# Patient Record
Sex: FEMALE | Race: WHITE | Employment: FULL TIME | URBAN - METROPOLITAN AREA
[De-identification: names, ages, dates, MRNs, and addresses within clinical notes are randomized per-mention and may not be internally consistent; named-entity substitution may affect disease eponyms.]

---

## 2018-03-04 ENCOUNTER — HOSPITAL ENCOUNTER (EMERGENCY)
Age: 33
Discharge: HOME | End: 2018-03-04
Attending: FAMILY MEDICINE

## 2018-03-04 VITALS
OXYGEN SATURATION: 98 % | TEMPERATURE: 97.2 F | SYSTOLIC BLOOD PRESSURE: 140 MMHG | HEART RATE: 86 BPM | HEIGHT: 67 IN | DIASTOLIC BLOOD PRESSURE: 81 MMHG | BODY MASS INDEX: 21.74 KG/M2 | RESPIRATION RATE: 18 BRPM | WEIGHT: 138.5 LBS

## 2018-03-04 DIAGNOSIS — J06.9 VIRAL URI WITH COUGH: Primary | ICD-10-CM

## 2018-03-04 LAB
INFLUENZA A AG (POC): NORMAL
INFLUENZA AG (POC) NEGATIVE CTRL.: NORMAL
INFLUENZA AG (POC) POSITIVE CTRL.: NORMAL
INFLUENZA B AG (POC): NORMAL
LOT NUMBER POC: NORMAL

## 2018-03-04 RX ORDER — CALCIUM CARBONATE 260MG(650)
TABLET,CHEWABLE ORAL
Qty: 1 PACKAGE | Refills: 0 | Status: SHIPPED | OUTPATIENT
Start: 2018-03-04 | End: 2019-01-05

## 2018-03-04 RX ORDER — BENZONATATE 100 MG/1
100 CAPSULE ORAL
Qty: 15 CAP | Refills: 0 | Status: SHIPPED | OUTPATIENT
Start: 2018-03-04 | End: 2018-03-09

## 2018-03-04 NOTE — DISCHARGE INSTRUCTIONS
Viral Respiratory Infection: Care Instructions  Your Care Instructions    Viruses are very small organisms. They grow in number after they enter your body. There are many types that cause different illnesses, such as colds and the mumps. The symptoms of a viral respiratory infection often start quickly. They include a fever, sore throat, and runny nose. You may also just not feel well. Or you may not want to eat much. Most viral respiratory infections are not serious. They usually get better with time and self-care. Antibiotics are not used to treat a viral infection. That's because antibiotics will not help cure a viral illness. In some cases, antiviral medicine can help your body fight a serious viral infection. Follow-up care is a key part of your treatment and safety. Be sure to make and go to all appointments, and call your doctor if you are having problems. It's also a good idea to know your test results and keep a list of the medicines you take. How can you care for yourself at home? · Rest as much as possible until you feel better. · Be safe with medicines. Take your medicine exactly as prescribed. Call your doctor if you think you are having a problem with your medicine. You will get more details on the specific medicine your doctor prescribes. · Take an over-the-counter pain medicine, such as acetaminophen (Tylenol), ibuprofen (Advil, Motrin), or naproxen (Aleve), as needed for pain and fever. Read and follow all instructions on the label. Do not give aspirin to anyone younger than 20. It has been linked to Reye syndrome, a serious illness. · Drink plenty of fluids, enough so that your urine is light yellow or clear like water. Hot fluids, such as tea or soup, may help relieve congestion in your nose and throat. If you have kidney, heart, or liver disease and have to limit fluids, talk with your doctor before you increase the amount of fluids you drink.   · Try to clear mucus from your lungs by breathing deeply and coughing. · Gargle with warm salt water once an hour. This can help reduce swelling and throat pain. Use 1 teaspoon of salt mixed in 1 cup of warm water. · Do not smoke or allow others to smoke around you. If you need help quitting, talk to your doctor about stop-smoking programs and medicines. These can increase your chances of quitting for good. To avoid spreading the virus  · Cough or sneeze into a tissue. Then throw the tissue away. · If you don't have a tissue, use your hand to cover your cough or sneeze. Then clean your hand. You can also cough into your sleeve. · Wash your hands often. Use soap and warm water. Wash for 15 to 20 seconds each time. · If you don't have soap and water near you, you can clean your hands with alcohol wipes or gel. When should you call for help? Call your doctor now or seek immediate medical care if:  ? · You have a new or higher fever. ? · Your fever lasts more than 48 hours. ? · You have trouble breathing. ? · You have a fever with a stiff neck or a severe headache. ? · You are sensitive to light. ? · You feel very sleepy or confused. ? Watch closely for changes in your health, and be sure to contact your doctor if:  ? · You do not get better as expected. Where can you learn more? Go to http://ally-dalton.info/. Enter P446 in the search box to learn more about \"Viral Respiratory Infection: Care Instructions. \"  Current as of: May 12, 2017  Content Version: 11.4  © 2428-6360 [employer]. Care instructions adapted under license by [employer] (which disclaims liability or warranty for this information). If you have questions about a medical condition or this instruction, always ask your healthcare professional. [address] any warranty or liability for your use of this information.

## 2018-03-04 NOTE — UC PROVIDER NOTE
HPI Comments:   2nd day of runny nose, nasal congestion, sore throat, mild dry cough. Subjective fever. Onset gradual and has gotten worse. \"whole house is sick\" her kids tested negative for flu and strep. She tried dayquil without any relief. Drinking plenty of fluids. No aggravating or alleviating factors. Denies underlying medical conditions/significant PMH. Is not a smoker. Patient is a 29 y.o. female presenting with cold symptoms. Cold Symptoms    Associated symptoms include congestion, rhinorrhea and sore throat. Pertinent negatives include no chest pain, no diarrhea, no rash and no wheezing. History reviewed. No pertinent past medical history. History reviewed. No pertinent surgical history. History reviewed. No pertinent family history. Social History     Social History    Marital status:      Spouse name: N/A    Number of children: N/A    Years of education: N/A     Occupational History    Not on file. Social History Main Topics    Smoking status: Never Smoker    Smokeless tobacco: Never Used    Alcohol use Not on file    Drug use: Not on file    Sexual activity: Not on file     Other Topics Concern    Not on file     Social History Narrative                ALLERGIES: Pcn [penicillins]    Review of Systems   Constitutional: Negative for chills, diaphoresis and fatigue. HENT: Positive for congestion, postnasal drip, rhinorrhea and sore throat. Negative for voice change. Eyes: Negative for visual disturbance. Respiratory: Negative for shortness of breath and wheezing. Cardiovascular: Negative for chest pain and palpitations. Gastrointestinal: Negative for diarrhea and rectal pain. Genitourinary: Negative for vaginal pain. Musculoskeletal: Negative for neck stiffness. Skin: Negative for rash. Neurological: Negative for weakness. All other systems reviewed and are negative.       Vitals:    03/04/18 1526   BP: 140/81   Pulse: 86   Resp: 18 Temp: 97.2 °F (36.2 °C)   SpO2: 98%   Weight: 62.8 kg (138 lb 8 oz)   Height: 5' 7\" (1.702 m)       Physical Exam   Constitutional: She is oriented to person, place, and time. No distress. Non-toxic appearing, well hydrated   HENT:   Mouth/Throat: No oropharyngeal exudate. Active nasal sniffling. TMs normal appearing bilat  Erythematous nasal turbinates with clear rhinorrhea  OP mild erythema without swelling or exudate. Uvula midline. No oral lesions. Eyes: EOM are normal. Pupils are equal, round, and reactive to light. No scleral icterus. Neck: Normal range of motion. Neck supple. Cardiovascular: Normal rate, regular rhythm and normal heart sounds. Exam reveals no gallop and no friction rub. No murmur heard. Pulmonary/Chest: Effort normal and breath sounds normal. No respiratory distress. She has no wheezes. She has no rales. Good air movement throughout   Abdominal: She exhibits no distension. Lymphadenopathy:     She has no cervical adenopathy. Neurological: She is alert and oriented to person, place, and time. No cranial nerve deficit. Skin: Skin is warm and dry. No rash noted. She is not diaphoretic. No erythema. No pallor. Psychiatric: She has a normal mood and affect. Her behavior is normal. Thought content normal.   Nursing note and vitals reviewed. MDM     Differential Diagnosis; Clinical Impression; Plan:       CLINICAL IMPRESSION:  (J06.9,  B97.89) Viral URI with cough  (primary encounter diagnosis)    Orders Placed This Encounter      POC INFLUENZA A & B SCREEN  RX      benzonatate (TESSALON PERLES) 100 mg capsule      oxymetazoline (AFRIN NO DRIP,OXYMETAZOLIN,) 0.05 % mist      zinc gluconate (ZICAM) 10 mg lozenges    Rapid flu negative. Classic viral URI presentation. Plan:  1. See above orders  2. Maintain adequate fluid inatke  3.  Review handtouts      We have reviewed concerning signs/symptoms, normal vs abnormal progression of medical condition and when to seek immediate medical attention. Schedule with PCP or Urgent Care immediately for worsening or new symptoms. See your PCP if there is not at least some improvement in symptoms within the next 7 days. You should see your PCP for updates on your routine health maintenance.    Risk of Significant Complications, Morbidity, and/or Mortality:   Presenting problems:  Low  Diagnostic procedures:  Low  Management options:  Low  Progress:   Patient progress:  Stable      Procedures

## 2019-01-05 ENCOUNTER — OFFICE VISIT (OUTPATIENT)
Dept: URGENT CARE | Age: 34
End: 2019-01-05

## 2019-01-05 VITALS
TEMPERATURE: 98 F | SYSTOLIC BLOOD PRESSURE: 119 MMHG | HEIGHT: 67 IN | DIASTOLIC BLOOD PRESSURE: 79 MMHG | RESPIRATION RATE: 18 BRPM | BODY MASS INDEX: 21.82 KG/M2 | WEIGHT: 139 LBS | HEART RATE: 100 BPM | OXYGEN SATURATION: 98 %

## 2019-01-05 DIAGNOSIS — J30.9 ALLERGIC RHINITIS, UNSPECIFIED SEASONALITY, UNSPECIFIED TRIGGER: ICD-10-CM

## 2019-01-05 DIAGNOSIS — J02.9 SORE THROAT: Primary | ICD-10-CM

## 2019-01-05 LAB
S PYO AG THROAT QL: NEGATIVE
VALID INTERNAL CONTROL?: YES

## 2019-01-05 RX ORDER — IBUPROFEN 600 MG/1
600 TABLET ORAL
Qty: 20 TAB | Refills: 0
Start: 2019-01-05

## 2019-01-05 RX ORDER — NORETHINDRONE ACETATE AND ETHINYL ESTRADIOL 5-7-9-7
KIT ORAL
Refills: 3 | COMMUNITY
Start: 2018-11-20

## 2019-01-05 RX ORDER — FLUTICASONE PROPIONATE 50 MCG
2 SPRAY, SUSPENSION (ML) NASAL DAILY
Qty: 1 BOTTLE | Refills: 0 | Status: SHIPPED | OUTPATIENT
Start: 2019-01-05

## 2019-01-05 NOTE — PROGRESS NOTES
Cold Symptoms   The history is provided by the patient. This is a recurrent (had similar symptoms starting on Delon day that resolved on their own after 4 days. ) problem. The current episode started yesterday. The problem has not changed since onset. There has been no fever. Associated symptoms include chills, headaches, rhinorrhea and sore throat. Pertinent negatives include no sweats, no myalgias, no shortness of breath, no nausea and no vomiting. Treatments tried: ibuprofen. The treatment provided mild relief. She is not a smoker. Her past medical history does not include bronchitis, pneumonia or asthma. History reviewed. No pertinent past medical history. History reviewed. No pertinent surgical history. History reviewed. No pertinent family history. Social History     Socioeconomic History    Marital status:      Spouse name: Not on file    Number of children: Not on file    Years of education: Not on file    Highest education level: Not on file   Social Needs    Financial resource strain: Not on file    Food insecurity - worry: Not on file    Food insecurity - inability: Not on file    Transportation needs - medical: Not on file   [employer] needs - non-medical: Not on file   Occupational History    Not on file   Tobacco Use    Smoking status: Never Smoker    Smokeless tobacco: Never Used   Substance and Sexual Activity    Alcohol use: Not on file    Drug use: Not on file    Sexual activity: Not on file   Other Topics Concern    Not on file   Social History Narrative    Not on file                ALLERGIES: Pcn [penicillins]    Review of Systems   Constitutional: Positive for chills. HENT: Positive for rhinorrhea, sneezing, sore throat and trouble swallowing. Negative for congestion, sinus pressure and voice change. Respiratory: Negative for cough and shortness of breath. Gastrointestinal: Negative for nausea and vomiting.    Musculoskeletal: Negative for myalgias. Neurological: Positive for headaches. Vitals:    01/05/19 0836   BP: 119/79   Pulse: 100   Resp: 18   Temp: 98 °F (36.7 °C)   SpO2: 98%   Weight: 139 lb (63 kg)   Height: 5' 7\" (1.702 m)       Physical Exam   Constitutional: She is oriented to person, place, and time. She appears well-developed and well-nourished. No distress. HENT:   Head: Normocephalic and atraumatic. Right Ear: External ear normal. Right ear drainage: occluded by cerumen. Left Ear: External ear normal. Left ear drainage: occluded by cerumen. Nose: Rhinorrhea present. No mucosal edema. Right sinus exhibits no maxillary sinus tenderness and no frontal sinus tenderness. Left sinus exhibits no maxillary sinus tenderness and no frontal sinus tenderness. Mouth/Throat: Uvula is midline and mucous membranes are normal. Posterior oropharyngeal erythema present. No oropharyngeal exudate or posterior oropharyngeal edema.   + facial flushing in T zone   Eyes: Pupils are equal, round, and reactive to light. Cardiovascular: Normal rate, normal heart sounds and intact distal pulses. No murmur heard. Pulmonary/Chest: Effort normal and breath sounds normal. No respiratory distress. She has no decreased breath sounds. She has no wheezes. She has no rhonchi. Abdominal: Soft. She exhibits no distension. Musculoskeletal: She exhibits no edema. Neurological: She is alert and oriented to person, place, and time. Skin: Skin is warm and dry. She is not diaphoretic. Psychiatric: She has a normal mood and affect. Her behavior is normal. Judgment normal.   Nursing note and vitals reviewed. MDM    Procedures      CLINICAL IMPRESSION:     ICD-10-CM ICD-9-CM    1. Sore throat J02.9 462 AMB POC RAPID STREP A      ibuprofen (MOTRIN) 600 mg tablet      CULTURE, STREP THROAT   2.  Allergic rhinitis, unspecified seasonality, unspecified trigger J30.9 477.9 fluticasone (FLONASE) 50 mcg/actuation nasal spray Plan:  F/U with PCP in 3-5 days INI  Orders Placed This Encounter    CULTURE, STREP THROAT    AMB POC RAPID STREP A    TRI-LEGEST FE 1-20(5)/1-30(7) /1mg-35mcg (9) tab     Sig: TAKE 1 TABLET BY MOUTH EVERY DAY     Refill:  3    fluticasone (FLONASE) 50 mcg/actuation nasal spray     Si Sprays by Both Nostrils route daily. Dispense:  1 Bottle     Refill:  0    ibuprofen (MOTRIN) 600 mg tablet     Sig: Take 1 Tab by mouth every six (6) hours as needed for Pain. Dispense:  20 Tab     Refill:  0           The patients condition was discussed with the patient and they understand. The patient is to follow up with PCP. If signs and symptoms become worse the pt is to go to the ER. The patient is to take medications as prescribed.

## 2019-01-05 NOTE — PATIENT INSTRUCTIONS
Follow Up with PCP in 3-5 days for routine care. Call to be seen sooner or return HERE, if no improvement with treatments advised/prescribed or symptoms/pain worsen (develop fever, pain worsens significantly, or develop NEW symptoms). Try using the nasal spray holding it with your opposing hand  for the nostril you are spraying (left hand, right nostril) and aim it at the ear on that side. Hold the bottle parallel to the floor then spray to see if this helps. May use  Benadryl, Phenylephrine, or Chlor-Trimetron for continued cough and sore throat. Claritin, allegra, Xyzal, or Zyrtec may also help. Also try Breathe-Rite (or similar) nose strips for nasal congestion and difficulty sleeping. Try 2 teaspoons of 100% pure honey at bedtime to help with nighttime coughing/sore throat. Vitamin C (0882-3675 mg) may also be helpful for your symptoms. *Frequent handwashing*, rest, and plenty of fluids are most important. Allergies: Care Instructions  Your Care Instructions    Allergies occur when your body's defense system (immune system) overreacts to certain substances. The immune system treats a harmless substance as if it were a harmful germ or virus. Many things can cause this overreaction, including pollens, medicine, food, dust, animal dander, and mold. Allergies can be mild or severe. Mild allergies can be managed with home treatment. But medicine may be needed to prevent problems. Managing your allergies is an important part of staying healthy. Your doctor may suggest that you have allergy testing to help find out what is causing your allergies. When you know what things trigger your symptoms, you can avoid them. This can prevent allergy symptoms and other health problems. For severe allergies that cause reactions that affect your whole body (anaphylactic reactions), your doctor may prescribe a shot of epinephrine to carry with you in case you have a severe reaction.  Learn how to give yourself the shot and keep it with you at all times. Make sure it is not . Follow-up care is a key part of your treatment and safety. Be sure to make and go to all appointments, and call your doctor if you are having problems. It's also a good idea to know your test results and keep a list of the medicines you take. How can you care for yourself at home? · If you have been told by your doctor that dust or dust mites are causing your allergy, decrease the dust around your bed:  ? Wash sheets, pillowcases, and other bedding in hot water every week. ? Use dust-proof covers for pillows, duvets, and mattresses. Avoid plastic covers because they tear easily and do not \"breathe. \" Wash as instructed on the label. ? Do not use any blankets and pillows that you do not need. ? Use blankets that you can wash in your washing machine. ? Consider removing drapes and carpets, which attract and hold dust, from your bedroom. · If you are allergic to house dust and mites, do not use home humidifiers. Your doctor can suggest ways you can control dust and mites. · Look for signs of cockroaches. Cockroaches cause allergic reactions. Use cockroach baits to get rid of them. Then, clean your home well. Cockroaches like areas where grocery bags, newspapers, empty bottles, or cardboard boxes are stored. Do not keep these inside your home, and keep trash and food containers sealed. Seal off any spots where cockroaches might enter your home. · If you are allergic to mold, get rid of furniture, rugs, and drapes that smell musty. Check for mold in the bathroom. · If you are allergic to outdoor pollen or mold spores, use air-conditioning. Change or clean all filters every month. Keep windows closed. · If you are allergic to pollen, stay inside when pollen counts are high. Use a vacuum  with a HEPA filter or a double-thickness filter at least two times each week. · Stay inside when air pollution is bad.  Avoid paint fumes, perfumes, and other strong odors. · Avoid conditions that make your allergies worse. Stay away from smoke. Do not smoke or let anyone else smoke in your house. Do not use fireplaces or wood-burning stoves. · If you are allergic to your pets, change the air filter in your furnace every month. Use high-efficiency filters. · If you are allergic to pet dander, keep pets outside or out of your bedroom. Old carpet and cloth furniture can hold a lot of animal dander. You may need to replace them. When should you call for help? Give an epinephrine shot if:    · You think you are having a severe allergic reaction.     · You have symptoms in more than one body area, such as mild nausea and an itchy mouth.    After giving an epinephrine shot call 911, even if you feel better.   Call 911 if:    · You have symptoms of a severe allergic reaction. These may include:  ? Sudden raised, red areas (hives) all over your body. ? Swelling of the throat, mouth, lips, or tongue. ? Trouble breathing. ? Passing out (losing consciousness). Or you may feel very lightheaded or suddenly feel weak, confused, or restless.     · You have been given an epinephrine shot, even if you feel better.    Call your doctor now or seek immediate medical care if:    · You have symptoms of an allergic reaction, such as:  ? A rash or hives (raised, red areas on the skin). ? Itching. ? Swelling. ? Belly pain, nausea, or vomiting.    Watch closely for changes in your health, and be sure to contact your doctor if:    · You do not get better as expected. Where can you learn more? Go to http://ally-dalton.info/. Enter J312 in the search box to learn more about \"Allergies: Care Instructions. \"  Current as of: June 28, 2018  Content Version: 11.8  © 7234-6660 [employer]. Care instructions adapted under license by [email or web address] (which disclaims liability or warranty for this information).  If you have questions about a medical condition or this instruction, always ask your healthcare professional. [place] [address] any warranty or liability for your use of this information.

## 2019-01-08 LAB — S PYO THROAT QL CULT: NEGATIVE

## 2020-07-16 ENCOUNTER — OFFICE VISIT (OUTPATIENT)
Dept: NEUROLOGY | Age: 35
End: 2020-07-16

## 2020-07-16 VITALS
WEIGHT: 134 LBS | OXYGEN SATURATION: 98 % | DIASTOLIC BLOOD PRESSURE: 70 MMHG | RESPIRATION RATE: 18 BRPM | HEART RATE: 80 BPM | HEIGHT: 67 IN | TEMPERATURE: 98.2 F | SYSTOLIC BLOOD PRESSURE: 118 MMHG | BODY MASS INDEX: 21.03 KG/M2

## 2020-07-16 DIAGNOSIS — G44.219 EPISODIC TENSION-TYPE HEADACHE, NOT INTRACTABLE: Primary | ICD-10-CM

## 2020-07-16 RX ORDER — SUMATRIPTAN 100 MG/1
50 TABLET, FILM COATED ORAL
COMMUNITY
Start: 2020-06-21

## 2020-07-16 RX ORDER — METHOCARBAMOL 750 MG/1
TABLET, FILM COATED ORAL
COMMUNITY
Start: 2020-06-21

## 2020-07-16 RX ORDER — DIPHENHYDRAMINE HCL 25 MG
25 CAPSULE ORAL
COMMUNITY
Start: 2020-06-22

## 2020-07-16 RX ORDER — METOCLOPRAMIDE 10 MG/1
10 TABLET ORAL
COMMUNITY
Start: 2020-06-22

## 2020-07-16 RX ORDER — ONDANSETRON 4 MG/1
TABLET, ORALLY DISINTEGRATING ORAL
COMMUNITY
Start: 2020-06-21

## 2020-07-16 RX ORDER — KETOROLAC TROMETHAMINE 10 MG/1
TABLET, FILM COATED ORAL
COMMUNITY
Start: 2020-06-21

## 2020-07-16 NOTE — PROGRESS NOTES
Referring Physician: Soraya Rivera MD     Reason for Consultation:  Headache     Chief Complaint: Migraines     History of Present Illness:   Philip Ware is a 37 y.o. female with hx of general anxiety disorder who presents to neurology clinic for evaluation of headaches. She reports that her headache started approximately 1 year ago and she had been having headaches approximately every 2 months. She reports from the month of December to June she did not have any headaches. In June 2020 patient reports that she felt a popping sensation in her head in the occipital region and since that time developed a migraine. It seems that during the month of June she had several headaches almost every week. She also reports that her last headache lasted approximately 10 days. She required a visit to urgent care requiring a Toradol injection for some mild improvement. It seems that this headache continued to worsen and patient was taken to the emergency room for a headache cocktail. This resolved her pain. She reports her last headache was on June 28. Characteristics:  Location: bilateral temples   Character: pressure on both sides  Radiation: none   Intensity: On average 7/10   Frequency: one every two months, then headache free since dec to mick   Duration: 2 days  Triggers: none, possibly stress. No worsening reported with cough/sneeze, bending over  Alleviating factors: imitrex, lying the dark room   Aura: none   Associated Sx with HA: nausea/vomiting, phonophotophobia.   Denies unilateral ptosis, conjunctival injection, lacrimation, sweating  Prior treatments: imitrex, over the counter   Prior Imaging: HCT normal   Caffeine use: 1 cup per day  H/O Head trauma:none  Depressive or anxiety Sx: anxiety   Stop BANG score (CUONG):  1 (snores, no apneic events)      Medical Hx:   JUAN   Headaches     Surgical Hx   tonnsillectomy     Social History     Tobacco Use    Smoking status: Never Smoker    Smokeless tobacco: Never Used   Substance Use Topics    Alcohol use: Not on file        Allergies   Allergen Reactions    Pcn [Penicillins] Rash        Prior to Admission medications    Medication Sig Start Date End Date Taking? Authorizing Provider   ondansetron (ZOFRAN ODT) 4 mg disintegrating tablet DISSOLVE 1 TO 2 TABLETS IN MOUTH EVERY 4 TO 6 HOURS AS NEEDED FOR NAUSEA 6/21/20  Yes Provider, Historical   SUMAtriptan (IMITREX) 100 mg tablet TAKE 1 TABLET BY MOUTH ONCE DAILY IF NEEDED FOR MIGRAINE MAY REPEAT IN 2 HOURS IF MIGRAINE PERSISTS. MAX 2 PER 24 HOURS 6/21/20  Yes Provider, Historical   TRI-LEGEST FE 1-20(5)/1-30(7) /1mg-35mcg (9) tab TAKE 1 TABLET BY MOUTH EVERY DAY 11/20/18  Yes Provider, Historical   ibuprofen (MOTRIN) 600 mg tablet Take 1 Tab by mouth every six (6) hours as needed for Pain. 1/5/19  Yes Thi Irizarry NP   diphenhydrAMINE (BENADRYL) 25 mg capsule Take 25 mg by mouth every six (6) hours as needed. 6/22/20   Provider, Historical   ketorolac (TORADOL) 10 mg tablet TAKE 1 TABLET BY MOUTH EVERY 8 HOURS AS NEEDED 6/21/20   Provider, Historical   methocarbamoL (ROBAXIN) 750 mg tablet TAKE 1 TABLET BY MOUTH EVERY 8 HOURS AS NEEDED 6/21/20   Provider, Historical   metoclopramide HCl (REGLAN) 10 mg tablet Take 10 mg by mouth every six (6) hours as needed. 6/22/20   Provider, Historical   fluticasone (FLONASE) 50 mcg/actuation nasal spray 2 Sprays by Both Nostrils route daily.  1/5/19   Eduin Perry NP       Review of Systems:  General, constitutional: negative  Eyes, vision: negative  Ears, nose, throat: negative  Cardiovascular, heart: negative  Respiratory: negative  Gastrointestinal: negative  Genitourinary: negative  Musculoskeletal: negative  Skin and integumentary: negative  Psychiatric: negative  Endocrine: negative  Neurological: negative, except for HPI  Hematologic/lymphatic: negative  Allergy/immunology: negative    Visit Vitals  /70   Pulse 80   Temp 98.2 °F (36.8 °C) (Oral)   Resp 18   Ht 5' 7\" (1.702 m)   Wt 134 lb (60.8 kg)   SpO2 98%   BMI 20.99 kg/m²       Physical Exam:  General:  no acute distress  Neck: no carotid bruits  Lungs: clear to auscultation  Heart:  no murmurs, regular rate and rhythm   Lower extremity: no edema    Neurological exam:  Mental Status: Awake, alert, oriented to person, place and time  Attention and Concentration: able to state the days of the week backwards   Speech and Language: No dysarthria. Able to name, repeat and follow commands   Fund of knowledge was preserved    Cranial nerves: II-XII  Pupils equal and reactive, visual fields intact by confrontation   Fundus: venous pulsations and sharp disc margins noted bilaterally  Extraocular movements intact, no evidence of nystagmus or ptosis   Facial sensation intact   Facial movements symmetric   Hearing intact to soft rub bilaterally   Shoulder shrug symmetric and strong   Tongue protrusion full and midline without fasciculation or atrophy    Motor:   Normal tone and Bulk   Drift: No evidence of pronator drift     Strength testing:   deltoid triceps biceps Wrist ext. Wrist flex. intrinsics   Right 5 5 5 5 5 5   Left 5 5 5 5 5 5      Hip flex. Hip ext. Knee ext. Knee flex Dorsi flex Plantar flex   Right  5 5 5 5 5 5   Left  5 5 5 5 5 5       Sensory:  Intact to light touch and temp, no extinction     Reflexes:     Biceps Triceps  Brachiorad Patellar Achilles Plantar Hoffmans   Right  2 2 2 2 2 Down Neg   Left  2 2 2 2 2 Down Neg        Cerebellar testing:  No dysmetria. Normal rapid alternating movements; finger-to-nose and heel-to- shin testing are within normal limits. Romberg: absent    Gait: steady. Heel, toe, and tandem gait were normal    Data:   No data available for review. Assessment and Plan   Thomas Eden is a 37 y.o. female with hx of general anxiety disorder who presents to neurology clinic for evaluation of headaches which do appear to be tension type headaches with some migrainous features. Her neurological exam is unremarkable. Tension type headaches: With migrainous features headaches occur once every 2 months. For abortive therapy will continue with Imitrex however will decrease dose to 50 mg at the onset of the headache. I did discuss that patient can take a second dose at the time of the headache however would recommend her taking this with Aleve. Given headache frequency occurs once every 2 months, patient does not need preventative therapy at this time. We did discuss migraine relief as this would be a safe alternative for headache prevention. She was given information regarding this medication. Given that patient just started having headaches will obtain an MRI of the brain with and without contrast to ensure that there are no acute abnormalities resulting in her headaches. The likelihood of this is low given her normal neurological exam and no evidence of papilledema. We discussed the importance of staying hydrated and proper sleep hygiene to help prevent headaches. We also discussed the importance of diet and exercise as this can help reduce headaches. I did recommend patient keep a log of her headaches to determine a potential trigger. I have discussed the diagnosis with the patient and the intended plan as seen in the above orders. Patient is in agreement. The patient has received an after-visit summary and questions were answered concerning future plans. I have discussed medication side effects and warnings with the patient as well.         Signed By:  Cesar Melissa MD     July 16, 2020

## 2020-07-16 NOTE — PATIENT INSTRUCTIONS
Migraine Headache: Care Instructions  Your Care Instructions  Migraines are painful, throbbing headaches that often start on one side of the head. They may cause nausea and vomiting and make you sensitive to light, sound, or smell. Without treatment, migraines can last from 4 hours to a few days. Medicines can help prevent migraines or stop them after they have started. Your doctor can help you find which ones work best for you. Follow-up care is a key part of your treatment and safety. Be sure to make and go to all appointments, and call your doctor if you are having problems. It's also a good idea to know your test results and keep a list of the medicines you take. How can you care for yourself at home? · Do not drive if you have taken a prescription pain medicine. · Rest in a quiet, dark room until your headache is gone. Close your eyes, and try to relax or go to sleep. Don't watch TV or read. · Put a cold, moist cloth or cold pack on the painful area for 10 to 20 minutes at a time. Put a thin cloth between the cold pack and your skin. · Use a warm, moist towel or a heating pad set on low to relax tight shoulder and neck muscles. · Have someone gently massage your neck and shoulders. · Take your medicines exactly as prescribed. Call your doctor if you think you are having a problem with your medicine. You will get more details on the specific medicines your doctor prescribes. · Don't take medicine for headache pain too often. Talk to your doctor if you are taking medicine more than 2 days a week to stop a headache. Taking too much pain medicine can lead to more headaches. These are called medicine-overuse headaches. To prevent migraines  · Keep a headache diary so you can figure out what triggers your headaches. Avoiding triggers may help you prevent headaches. Record when each headache began, how long it lasted, and what the pain was like.  Write down any other symptoms you had with the headache, such as nausea, flashing lights or dark spots, or sensitivity to bright light or loud noise. Note if the headache occurred near your period. List anything that might have triggered the headache. Triggers may include certain foods (chocolate, cheese, wine) or odors, smoke, bright light, stress, or lack of sleep. · If your doctor has prescribed medicine for your migraines, take it as directed. You may have medicine that you take only when you get a migraine and medicine that you take all the time to help prevent migraines. ? If your doctor has prescribed medicine for when you get a headache, take it at the first sign of a migraine, unless your doctor has given you other instructions. ? If your doctor has prescribed medicine to prevent migraines, take it exactly as prescribed. Call your doctor if you think you are having a problem with your medicine. · Find healthy ways to deal with stress. Migraines are most common during or right after stressful times. Try finding ways to reduce stress like practicing mindfulness or deep breathing exercises. · Get plenty of sleep and exercise. But be careful to not push yourself too hard during exercise. It may trigger a headache. · Eat meals on a regular schedule. Avoid foods and drinks that often trigger migraines. These include chocolate, alcohol (especially red wine and port), aspartame, monosodium glutamate (MSG), and some additives found in foods (such as hot dogs, lin, cold cuts, aged cheeses, and pickled foods). · Limit caffeine. Don't drink too much coffee, tea, or soda. But don't quit caffeine suddenly. That can also give you migraines. · Do not smoke or allow others to smoke around you. If you need help quitting, talk to your doctor about stop-smoking programs and medicines. These can increase your chances of quitting for good. · If you are taking birth control pills or hormone therapy, talk to your doctor about whether they are triggering your migraines.   When should you call for help? [id number] anytime you think you may need emergency care. For example, call if:  · You have signs of a stroke. These may include:  ? Sudden numbness, paralysis, or weakness in your face, arm, or leg, especially on only one side of your body. ? Sudden vision changes. ? Sudden trouble speaking. ? Sudden confusion or trouble understanding simple statements. ? Sudden problems with walking or balance. ? A sudden, severe headache that is different from past headaches. Call your doctor now or seek immediate medical care if:  · You have new or worse nausea and vomiting. · You have a new or higher fever. · Your headache gets much worse. Watch closely for changes in your health, and be sure to contact your doctor if:  · You are not getting better after 2 days (48 hours). Where can you learn more? Go to http://ally-dalton.info/  Enter Q785 in the search box to learn more about \"Migraine Headache: Care Instructions. \"  Current as of: November 20, 2019               Content Version: 12.5  © 6034-6326 [employer]. Care instructions adapted under license by [employer] (which disclaims liability or warranty for this information). If you have questions about a medical condition or this instruction, always ask your healthcare professional. [address] any warranty or liability for your use of this information.

## 2020-08-14 ENCOUNTER — HOSPITAL ENCOUNTER (OUTPATIENT)
Dept: MRI IMAGING | Age: 35
Discharge: HOME | End: 2020-08-14
Attending: PSYCHIATRY & NEUROLOGY
Payer: COMMERCIAL

## 2020-08-14 DIAGNOSIS — G44.219 EPISODIC TENSION-TYPE HEADACHE, NOT INTRACTABLE: ICD-10-CM

## 2020-08-14 PROCEDURE — A9575 INJ GADOTERATE MEGLUMI 0.1ML: HCPCS | Performed by: PSYCHIATRY & NEUROLOGY

## 2020-08-14 PROCEDURE — 70553 MRI BRAIN STEM W/O & W/DYE: CPT

## 2020-08-14 PROCEDURE — 74011250636 HC RX REV CODE- 250/636: Performed by: PSYCHIATRY & NEUROLOGY

## 2020-08-14 RX ORDER — GADOTERATE MEGLUMINE 376.9 MG/ML
15 INJECTION INTRAVENOUS
Status: COMPLETED | OUTPATIENT
Start: 2020-08-14 | End: 2020-08-14

## 2020-08-14 RX ADMIN — GADOTERATE MEGLUMINE 12 ML: 376.9 INJECTION INTRAVENOUS at 11:11

## 2020-11-25 ENCOUNTER — TRANSCRIBE ORDER (OUTPATIENT)
Dept: SCHEDULING | Age: 35
End: 2020-11-25

## 2020-11-25 DIAGNOSIS — R10.33 PERIUMBILICAL ABDOMINAL PAIN: Primary | ICD-10-CM

## 2020-12-14 ENCOUNTER — HOSPITAL ENCOUNTER (OUTPATIENT)
Dept: CT IMAGING | Age: 35
Discharge: HOME | End: 2020-12-14
Attending: INTERNAL MEDICINE
Payer: COMMERCIAL

## 2020-12-14 DIAGNOSIS — R10.33 PERIUMBILICAL ABDOMINAL PAIN: ICD-10-CM

## 2020-12-14 PROCEDURE — 74177 CT ABD & PELVIS W/CONTRAST: CPT

## 2020-12-14 PROCEDURE — 74011000636 HC RX REV CODE- 636: Performed by: INTERNAL MEDICINE

## 2020-12-14 RX ADMIN — IOPAMIDOL 100 ML: 755 INJECTION, SOLUTION INTRAVENOUS at 13:47

## 2021-09-29 ENCOUNTER — OFFICE VISIT (OUTPATIENT)
Dept: NEUROLOGY | Age: 36
End: 2021-09-29
Payer: COMMERCIAL

## 2021-09-29 VITALS
HEART RATE: 87 BPM | HEIGHT: 67 IN | DIASTOLIC BLOOD PRESSURE: 74 MMHG | SYSTOLIC BLOOD PRESSURE: 136 MMHG | OXYGEN SATURATION: 98 % | BODY MASS INDEX: 21.03 KG/M2 | RESPIRATION RATE: 16 BRPM | WEIGHT: 134 LBS

## 2021-09-29 DIAGNOSIS — G44.219 EPISODIC TENSION-TYPE HEADACHE, NOT INTRACTABLE: ICD-10-CM

## 2021-09-29 DIAGNOSIS — M54.12 CERVICAL RADICULOPATHY: Primary | ICD-10-CM

## 2021-09-29 PROCEDURE — 99214 OFFICE O/P EST MOD 30 MIN: CPT | Performed by: PSYCHIATRY & NEUROLOGY

## 2021-09-29 NOTE — PATIENT INSTRUCTIONS

## 2021-09-29 NOTE — PROGRESS NOTES
Chief Complaint   Patient presents with    Follow-up     nerve pain in neck/back of the head, hasn't changed        PCP gave her a course of prednisone that helped     Has not had any injures in the past

## 2021-09-29 NOTE — PROGRESS NOTES
Chief Complaint: Migraines     History of Present Illness:   Mell Madrid is a 31 y.o. female with hx of general anxiety disorder who presents to neurology clinic for evaluation of headaches. She reports that her headache started approximately 1 year ago and she had been having headaches approximately every 2 months. She reports from the month of December to June she did not have any headaches. In June 2020 patient reports that she felt a popping sensation in her head in the occipital region and since that time developed a migraine. It seems that during the month of June she had several headaches almost every week. She also reports that her last headache lasted approximately 10 days. She required a visit to urgent care requiring a Toradol injection for some mild improvement. It seems that this headache continued to worsen and patient was taken to the emergency room for a headache cocktail. This resolved her pain. She reports her last headache was on June 28. Characteristics:  Location: bilateral temples   Character: pressure on both sides  Radiation: none   Intensity: On average 7/10   Frequency: one every two months, then headache free since dec to mick   Duration: 2 days  Triggers: none, possibly stress. No worsening reported with cough/sneeze, bending over  Alleviating factors: imitrex, lying the dark room   Aura: none   Associated Sx with HA: nausea/vomiting, phonophotophobia. Denies unilateral ptosis, conjunctival injection, lacrimation, sweating  Prior treatments: imitrex, over the counter   Prior Imaging: HCT normal   Caffeine use: 1 cup per day  H/O Head trauma:none  Depressive or anxiety Sx: anxiety   Stop BANG score (CUONG):  1 (snores, no apneic events)    Interval hx:   Patient returns to clinic today reports that she has been having more pain in the back of the head and the neck.   She reports that she continues to have this popping sensation at the back of her head followed by numbness and tingling in that area. She reports that her headaches have improved with the use of migrelief MAT bridge. Medical Hx:   JUAN   Headaches     Surgical Hx   tonnsillectomy     Social History     Tobacco Use    Smoking status: Never Smoker    Smokeless tobacco: Never Used   Substance Use Topics    Alcohol use: Yes     Alcohol/week: 1.0 - 2.0 standard drinks     Types: 1 - 2 Glasses of wine per week        Allergies   Allergen Reactions    Pcn [Penicillins] Rash        Prior to Admission medications    Medication Sig Start Date End Date Taking? Authorizing Provider   ibuprofen (MOTRIN) 600 mg tablet Take 1 Tab by mouth every six (6) hours as needed for Pain. 1/5/19  Yes Annette Irizarry, NP   diphenhydrAMINE (BENADRYL) 25 mg capsule Take 25 mg by mouth every six (6) hours as needed. Patient not taking: Reported on 9/29/2021 6/22/20   Provider, Historical   ketorolac (TORADOL) 10 mg tablet TAKE 1 TABLET BY MOUTH EVERY 8 HOURS AS NEEDED  Patient not taking: Reported on 9/29/2021 6/21/20   Provider, Historical   methocarbamoL (ROBAXIN) 750 mg tablet TAKE 1 TABLET BY MOUTH EVERY 8 HOURS AS NEEDED  Patient not taking: Reported on 9/29/2021 6/21/20   Provider, Historical   metoclopramide HCl (REGLAN) 10 mg tablet Take 10 mg by mouth every six (6) hours as needed. Patient not taking: Reported on 9/29/2021 6/22/20   Provider, Historical   ondansetron (ZOFRAN ODT) 4 mg disintegrating tablet DISSOLVE 1 TO 2 TABLETS IN MOUTH EVERY 4 TO 6 HOURS AS NEEDED FOR NAUSEA  Patient not taking: Reported on 9/29/2021 6/21/20   Provider, Historical   SUMAtriptan (IMITREX) 100 mg tablet 50 mg.  Taking 50 mg  Patient not taking: Reported on 9/29/2021 6/21/20   Provider, Historical   TRI-LEGEST FE 1-20(5)/1-30(7) /1mg-35mcg (9) tab TAKE 1 TABLET BY MOUTH EVERY DAY  Patient not taking: Reported on 9/29/2021 11/20/18   Provider, Historical   fluticasone (FLONASE) 50 mcg/actuation nasal spray 2 Sprays by Both Nostrils route daily. Patient not taking: Reported on 9/29/2021 1/5/19   Salvador Larson NP       Review of Systems:  General, constitutional: negative  Eyes, vision: negative  Ears, nose, throat: negative  Cardiovascular, heart: negative  Respiratory: negative  Gastrointestinal: negative  Genitourinary: negative  Musculoskeletal: negative  Skin and integumentary: negative  Psychiatric: negative  Endocrine: negative  Neurological: negative, except for HPI  Hematologic/lymphatic: negative  Allergy/immunology: negative    Visit Vitals  /74 (BP 1 Location: Left arm, BP Patient Position: Sitting, BP Cuff Size: Adult)   Pulse 87   Resp 16   Ht 5' 7\" (1.702 m)   Wt 134 lb (60.8 kg)   SpO2 98%   BMI 20.99 kg/m²       Physical Exam:  General:  no acute distress  Neck: no carotid bruits  Lungs: clear to auscultation  Heart:  no murmurs, regular rate and rhythm   Lower extremity: no edema    Neurological exam:  Mental Status: Awake, alert, oriented to person, place and time  Attention and Concentration: able to state the days of the week backwards   Speech and Language: No dysarthria. Able to name, repeat and follow commands   Fund of knowledge was preserved    Cranial nerves: II-XII  Pupils equal and reactive, visual fields intact by confrontation   Extraocular movements intact, no evidence of nystagmus or ptosis   Facial sensation intact   Facial movements symmetric   Hearing intact to soft rub bilaterally   Shoulder shrug symmetric and strong   Tongue protrusion full and midline without fasciculation or atrophy    Motor:   Normal tone and Bulk   Drift: No evidence of pronator drift     Strength testing:   deltoid triceps biceps Wrist ext. Wrist flex. intrinsics   Right 5 5 5 5 5 5   Left 5 5 5 5 5 5      Hip flex. Hip ext. Knee ext.   Knee flex Dorsi flex Plantar flex   Right  5 5 5 5 5 5   Left  5 5 5 5 5 5       Sensory:  Intact to light touch and temp, no extinction     Reflexes:     Biceps Triceps  Brachiorad Patellar Achilles Plantar Hoffmans   Right  2 2 2 2 2 Down Neg   Left  2 2 2 2 2 Down Neg        Cerebellar testing:  No dysmetria. finger-to-nose and heel-to- shin testing are within normal limits. Romberg: absent    Gait: steady. Heel, toe, and tandem gait were normal    Data:   No data available for review. Assessment and Plan   Astrid Reynolds is a 31 y.o. female with hx of general anxiety disorder who presents to neurology clinic for evaluation of headaches which do appear to be tension type headaches with some migrainous features. Her neurological exam is unremarkable. Tension type headaches: With migrainous features headaches occur once every 2 months. Headaches have improved in frequency and severity  For abortive therapy will continue with Imitrex however will decrease dose to 50 mg at the onset of the headache. I did discuss that patient can take a second dose at the time of the headache however would recommend her taking this with Aleve. Given headache frequency occurs once every 2 months, patient does not need preventative therapy at this time. We did discuss migraine relief as this would be a safe alternative for headache prevention. She was given information regarding this medication. Given that patient just started having headaches will obtain an MRI of the brain with and without contrast to ensure that there are no acute abnormalities resulting in her headaches. The likelihood of this is low given her normal neurological exam and no evidence of papilledema. We discussed the importance of staying hydrated and proper sleep hygiene to help prevent headaches. We also discussed the importance of diet and exercise as this can help reduce headaches. I did recommend patient keep a log of her headaches to determine a potential trigger. Cervical radiculopathy: Based on symptoms it is likely that patient does have a component of cervical radiculopathy.   This may have also been contributing to her headaches previously. She has had an x-ray with no abnormality noted. -We will obtain an MRI of the cervical spine to ensure that there is no evidence of stenosis and determine if there is evidence of radiculopathy.  -Her neurological exam is unremarkable and therefore I am not concerned for stenosis. -I did recommend that she trial physical therapy as well as dry needling as this will most likely be beneficial in her pain and symptoms. We did decide to hold off on this until her MRI is complete.  -If insurance does not allow for an MRI to be done, I would recommend an EMG with nerve conduction studies of the upper extremities. I have discussed the diagnosis with the patient and the intended plan as seen in the above orders. Patient is in agreement. The patient has received an after-visit summary and questions were answered concerning future plans. I have discussed medication side effects and warnings with the patient as well.         Signed By:  Antwon Waterman MD     September 29, 2021

## 2021-10-14 ENCOUNTER — HOSPITAL ENCOUNTER (OUTPATIENT)
Dept: MRI IMAGING | Age: 36
Discharge: HOME | End: 2021-10-14
Attending: PSYCHIATRY & NEUROLOGY
Payer: COMMERCIAL

## 2021-10-14 DIAGNOSIS — M54.12 CERVICAL RADICULOPATHY: ICD-10-CM

## 2021-10-14 PROCEDURE — 72141 MRI NECK SPINE W/O DYE: CPT

## 2021-11-30 ENCOUNTER — OFFICE VISIT (OUTPATIENT)
Dept: PRIMARY CARE CLINIC | Age: 36
End: 2021-11-30

## 2021-11-30 ENCOUNTER — TELEPHONE (OUTPATIENT)
Dept: PRIMARY CARE CLINIC | Age: 36
End: 2021-11-30

## 2021-11-30 DIAGNOSIS — B34.9 VIRAL ILLNESS: Primary | ICD-10-CM

## 2021-11-30 LAB — SARS-COV-2 POC: NEGATIVE

## 2021-11-30 PROCEDURE — 87426 SARSCOV CORONAVIRUS AG IA: CPT | Performed by: EMERGENCY MEDICINE

## 2022-01-02 ENCOUNTER — OFFICE VISIT (OUTPATIENT)
Dept: URGENT CARE | Age: 37
End: 2022-01-02

## 2022-01-02 VITALS — TEMPERATURE: 97.9 F | OXYGEN SATURATION: 98 % | HEART RATE: 82 BPM | RESPIRATION RATE: 17 BRPM

## 2022-01-02 DIAGNOSIS — Z20.822 SUSPECTED COVID-19 VIRUS INFECTION: ICD-10-CM

## 2022-01-02 DIAGNOSIS — Z20.822 EXPOSURE TO COVID-19 VIRUS: Primary | ICD-10-CM

## 2022-01-02 LAB — SARS-COV-2 POC: POSITIVE

## 2022-01-02 PROCEDURE — 87426 SARSCOV CORONAVIRUS AG IA: CPT | Performed by: FAMILY MEDICINE

## 2022-01-02 PROCEDURE — 99212 OFFICE O/P EST SF 10 MIN: CPT | Performed by: FAMILY MEDICINE

## 2022-01-02 NOTE — PROGRESS NOTES
This patient was seen at [address] [place] [address] Urgent Care while in their vehicle due to COVID-19 pandemic with PPE and focused examination in order to decrease community viral transmission. The patient/guardian gave verbal consent to treat. The history is provided by the patient. Nasal Congestion  This is a new problem. The current episode started more than 2 days ago. The problem occurs constantly. Associated symptoms include headaches. Associated symptoms comments: Runny and stuffy  With loss of smell and taste x 2 days  No fever- no bodyache. Nothing aggravates the symptoms. Nothing relieves the symptoms. She has tried nothing for the symptoms. History reviewed. No pertinent past medical history. History reviewed. No pertinent surgical history. History reviewed. No pertinent family history. Social History     Socioeconomic History    Marital status:      Spouse name: Not on file    Number of children: Not on file    Years of education: Not on file    Highest education level: Not on file   Occupational History    Not on file   Tobacco Use    Smoking status: Never Smoker    Smokeless tobacco: Never Used   Vaping Use    Vaping Use: Never used   Substance and Sexual Activity    Alcohol use: Yes     Alcohol/week: 1.0 - 2.0 standard drink     Types: 1 - 2 Glasses of wine per week    Drug use: Never    Sexual activity: Yes     Birth control/protection: None   Other Topics Concern    Not on file   Social History Narrative    Not on file     Social Determinants of Health     Financial Resource Strain:     Difficulty of Paying Living Expenses: Not on file   Food Insecurity:     Worried About Running Out of Food in the Last Year: Not on file    Adam of Food in the Last Year: Not on file   Transportation Needs:     Lack of Transportation (Medical): Not on file    Lack of Transportation (Non-Medical):  Not on file   Physical Activity:     Days of Exercise per Week: Not on file    Minutes of Exercise per Session: Not on file   Stress:     Feeling of Stress : Not on file   Social Connections:     Frequency of Communication with Friends and Family: Not on file    Frequency of Social Gatherings with Friends and Family: Not on file    Attends Denominational Services: Not on file    Active Member of Clubs or Organizations: Not on file    Attends Club or Organization Meetings: Not on file    Marital Status: Not on file   Intimate Partner Violence:     Fear of Current or Ex-Partner: Not on file    Emotionally Abused: Not on file    Physically Abused: Not on file    Sexually Abused: Not on file   Housing Stability:     Unable to Pay for Housing in the Last Year: Not on file    Number of [place] in the Last Year: Not on file    Unstable Housing in the Last Year: Not on file                ALLERGIES: Pcn [penicillins]    Review of Systems   Neurological: Positive for headaches. All other systems reviewed and are negative. Vitals:    01/02/22 1309   Pulse: 82   Resp: 17   Temp: 97.9 °F (36.6 °C)   SpO2: 98%       Physical Exam  Vitals and nursing note reviewed. Constitutional:       General: She is not in acute distress. Appearance: She is not ill-appearing. Pulmonary:      Effort: Pulmonary effort is normal. No respiratory distress. Breath sounds: No wheezing. MDM    Procedures      ICD-10-CM ICD-9-CM    1. Exposure to COVID-19 virus  Z20.822 V01.79 AMB POC SARS-COV-2   2. Suspected COVID-19 virus infection  Z20.822 V01.79 AMB POC SARS-COV-2     No orders of the defined types were placed in this encounter. Results for orders placed or performed in visit on 01/02/22   AMB POC SARS-COV-2   Result Value Ref Range    SARS-COV-2 POC Positive (A) Negative       Isolation x 1o days  Symptomatic rx     The patients condition was discussed with the patient and they understand. The patient is to follow up with primary care doctor.   If signs and symptoms become worse the pt is to go to the ER. The patient is to take medications as prescribed.

## 2022-01-19 ENCOUNTER — TELEPHONE (OUTPATIENT)
Dept: NEUROLOGY | Age: 37
End: 2022-01-19

## 2022-01-19 NOTE — TELEPHONE ENCOUNTER
MRI of cerv spine is pending denial    [phone] opt 4  Ref# [id number]    Info will be faxed to office as well.

## 2022-01-19 NOTE — PROGRESS NOTES
Please let patient know that the MRI of her neck did show some mild degenerative changes in the neck which could be resulting in her headaches. She would benefit from physical therapy if she is agreeable.   Please let patient know and I will place the order

## 2022-01-19 NOTE — TELEPHONE ENCOUNTER
----- Message from Amelia Rocha MD sent at 1/19/2022 12:10 PM EST -----  Please let patient know that the MRI of her neck did show some mild degenerative changes in the neck which could be resulting in her headaches. She would benefit from physical therapy if she is agreeable.   Please let patient know and I will place the order

## 2022-01-19 NOTE — TELEPHONE ENCOUNTER
Tried calling patient. Left patient voicemail to give the office a call back. Also stated I sent a [employer] message.

## 2022-05-19 LAB
CHOLEST SERPL-MCNC: 156 MG/DL
GLUCOSE SERPL-MCNC: 86 MG/DL (ref 65–100)
HDLC SERPL-MCNC: 104 MG/DL
LDLC SERPL CALC-MCNC: 39.4 MG/DL (ref 0–100)
TRIGL SERPL-MCNC: 63 MG/DL (ref ?–150)

## 2022-12-30 ENCOUNTER — OFFICE VISIT (OUTPATIENT)
Dept: PRIMARY CARE CLINIC | Age: 37
End: 2022-12-30
Payer: COMMERCIAL

## 2022-12-30 VITALS
SYSTOLIC BLOOD PRESSURE: 120 MMHG | RESPIRATION RATE: 18 BRPM | OXYGEN SATURATION: 97 % | HEART RATE: 80 BPM | WEIGHT: 142 LBS | HEIGHT: 67 IN | DIASTOLIC BLOOD PRESSURE: 81 MMHG | BODY MASS INDEX: 22.29 KG/M2 | TEMPERATURE: 97.8 F

## 2022-12-30 DIAGNOSIS — R11.0 NAUSEA: ICD-10-CM

## 2022-12-30 DIAGNOSIS — B34.9 VIRAL ILLNESS: Primary | ICD-10-CM

## 2022-12-30 LAB
FLUAV+FLUBV AG NOSE QL IA.RAPID: NEGATIVE
FLUAV+FLUBV AG NOSE QL IA.RAPID: NEGATIVE
VALID INTERNAL CONTROL?: YES

## 2022-12-30 RX ORDER — DEXTROMETHORPHAN HYDROBROMIDE AND GUAIFENESIN 20; 400 MG/20ML; MG/20ML
5 SOLUTION ORAL 4 TIMES DAILY
Qty: 120 ML | Refills: 1 | Status: SHIPPED | OUTPATIENT
Start: 2022-12-30 | End: 2023-01-09

## 2022-12-30 RX ORDER — FLUOXETINE 10 MG/1
10 CAPSULE ORAL DAILY
COMMUNITY
Start: 2022-12-15

## 2022-12-30 RX ORDER — ONDANSETRON 4 MG/1
4 TABLET, ORALLY DISINTEGRATING ORAL
Qty: 24 TABLET | Refills: 1 | Status: SHIPPED | OUTPATIENT
Start: 2022-12-30 | End: 2023-01-09

## 2022-12-30 NOTE — PROGRESS NOTES
Identified pt with two pt identifiers(name and ). Reviewed record in preparation for visit and have obtained necessary documentation. Chief Complaint   Patient presents with    Cold Symptoms     Would like flu test. Symptoms nausea past 48 hours. Headache. Vitals:    22 1253   BP: 120/81   Pulse: 80   Resp: 18   Temp: 97.8 °F (36.6 °C)   TempSrc: Temporal   SpO2: 97%   Weight: 142 lb (64.4 kg)   Height: 5' 7\" (1.702 m)   PainSc:   4       Health Maintenance Due   Topic    Hepatitis C Screening     Depression Screen     COVID-19 Vaccine (1)    Cervical cancer screen     Flu Vaccine (1)       Coordination of Care Questionnaire:  :   1) Have you been to an emergency room, urgent care, or hospitalized since your last visit? If yes, where when, and reason for visit? no       2. Have seen or consulted any other health care provider since your last visit? If yes, where when, and reason for visit? NO      Patient is accompanied by self I have received verbal consent from Jarett Shah to discuss any/all medical information while they are present in the room. An electronic signature was used to authenticate this note.   -- Attila Lucas LPN

## 2022-12-30 NOTE — PROGRESS NOTES
Sunil Bolanos ( 1985) is a 30 y.o. female, established patient, here for evaluation of the following chief complaint(s):  Cold Symptoms (Would like flu test. Symptoms nausea past 48 hours. Headache. )   Covid test this AM - negative. ASSESSMENT/PLAN:  Diagnoses and all orders for this visit:    1. Viral illness  -     AMB POC KENNEDY INFLUENZA A/B TEST    2. Nausea    Other orders  -     ondansetron (ZOFRAN ODT) 4 mg disintegrating tablet; Take 1 Tablet by mouth every eight (8) hours as needed for Nausea or Vomiting for up to 10 days. -     dextromethorphan-guaiFENesin (Delsym Cough-Chest Congest DM) 5-100 mg/5 mL liqd; Take 5 mL by mouth four (4) times daily for 10 days. Return in about 1 week (around 1/6/2023), or if symptoms worsen or fail to improve, for URI symptoms. Cold Symptoms  The history is provided by the Patient. This is a new problem. The current episode started 2 days ago. The problem occurs hourly. The cough is Non-productive. Associated symptoms include rhinorrhea and nausea. Pertinent negatives include no chest pain, no chills, no sweats, no weight loss and no ear pain. She has tried nothing for the symptoms. She is not a smoker. Her past medical history does not include COPD, heart failure or CHF. Subjective:   Pt is a 30 y.o. female (Would like flu test. Symptoms nausea past 48 hours. Headache. )  Nurse staff around her also have symptoms. Home COVID test this AM negative. Review of Systems   Constitutional:  Negative for chills, diaphoresis, fever and weight loss. HENT:  Positive for congestion and rhinorrhea. Negative for ear pain. Eyes: Negative. Respiratory:  Positive for cough. Cardiovascular: Negative. Negative for chest pain. Gastrointestinal:  Positive for nausea. Skin: Negative. No past medical history on file. No past surgical history on file.     Results for orders placed or performed in visit on 12/30/22   [facility] KENNEDY INFLUENZA A/B TEST   Result Value Ref Range    VALID INTERNAL CONTROL POC Yes     Influenza A Ag POC Negative Negative    Influenza B Ag POC Negative Negative             Objective:     Physical Exam  Vitals and nursing note reviewed. Constitutional:       Appearance: Normal appearance. She is normal weight. HENT:      Head: Normocephalic and atraumatic. Right Ear: Tympanic membrane and ear canal normal.      Left Ear: Tympanic membrane and ear canal normal.      Nose: Congestion and rhinorrhea present. Eyes:      Extraocular Movements: Extraocular movements intact. Conjunctiva/sclera: Conjunctivae normal.      Pupils: Pupils are equal, round, and reactive to light. Cardiovascular:      Rate and Rhythm: Normal rate. Pulmonary:      Effort: Pulmonary effort is normal.      Breath sounds: Normal breath sounds. Musculoskeletal:         General: Normal range of motion. Cervical back: Normal range of motion and neck supple. Skin:     General: Skin is warm. Neurological:      General: No focal deficit present. Mental Status: She is alert and oriented to person, place, and time. Psychiatric:         Mood and Affect: Mood normal.         Behavior: Behavior normal.       Results for orders placed or performed in visit on 12/30/22   AMB POC KENNEDY INFLUENZA A/B TEST   Result Value Ref Range    VALID INTERNAL CONTROL POC Yes     Influenza A Ag POC Negative Negative    Influenza B Ag POC Negative Negative               An electronic signature was used to authenticate this note.   -- Caitlin Wan PA-C

## 2023-02-28 ENCOUNTER — OFFICE VISIT (OUTPATIENT)
Dept: PRIMARY CARE CLINIC | Age: 38
End: 2023-02-28

## 2023-02-28 DIAGNOSIS — R68.89 FLU-LIKE SYMPTOMS: Primary | ICD-10-CM

## 2023-02-28 LAB
FLUAV+FLUBV AG NOSE QL IA.RAPID: NEGATIVE
FLUAV+FLUBV AG NOSE QL IA.RAPID: NEGATIVE
LOT NUMBER POC: NORMAL
SARS-COV-2 PCR, POC: NEGATIVE
VALID INTERNAL CONTROL?: YES
VALID INTERNAL CONTROL?: YES

## 2023-02-28 NOTE — PROGRESS NOTES
Spoke w/ patient; two patient identifiers confirmed. Informed that both covid and flu testing were negative, however due to direct exposure to a covid positive spouse and now exhibiting symptoms CDC does recommend 5 day quarantine from symptom start. Patient asked what to do about her about her job; informed that she should contact her employer to get further instruction regarding their protocol.     Hali Espinoza LPN

## 2023-05-26 RX ORDER — ONDANSETRON 4 MG/1
TABLET, ORALLY DISINTEGRATING ORAL
COMMUNITY
Start: 2020-06-21

## 2023-05-26 RX ORDER — IBUPROFEN 600 MG/1
600 TABLET ORAL EVERY 6 HOURS PRN
COMMUNITY
Start: 2019-01-05

## 2023-05-26 RX ORDER — SUMATRIPTAN 100 MG/1
50 TABLET, FILM COATED ORAL
COMMUNITY
Start: 2020-06-21

## 2023-05-26 RX ORDER — METOCLOPRAMIDE 10 MG/1
10 TABLET ORAL EVERY 6 HOURS PRN
COMMUNITY
Start: 2020-06-22

## 2023-05-26 RX ORDER — KETOROLAC TROMETHAMINE 10 MG/1
1 TABLET, FILM COATED ORAL EVERY 8 HOURS PRN
COMMUNITY
Start: 2020-06-21

## 2023-05-26 RX ORDER — FLUOXETINE 10 MG/1
10 CAPSULE ORAL DAILY
COMMUNITY
Start: 2022-12-15

## 2023-05-26 RX ORDER — DIPHENHYDRAMINE HCL 25 MG
25 CAPSULE ORAL EVERY 6 HOURS PRN
COMMUNITY
Start: 2020-06-22

## 2023-05-26 RX ORDER — NORETHINDRONE ACETATE AND ETHINYL ESTRADIOL AND FERROUS FUMARATE 5-7-9-7
1 KIT ORAL DAILY
COMMUNITY
Start: 2018-11-20

## 2023-05-26 RX ORDER — METHOCARBAMOL 750 MG/1
1 TABLET, FILM COATED ORAL EVERY 8 HOURS PRN
COMMUNITY
Start: 2020-06-21

## 2023-05-26 RX ORDER — FLUTICASONE PROPIONATE 50 MCG
2 SPRAY, SUSPENSION (ML) NASAL DAILY
COMMUNITY
Start: 2019-01-05

## 2024-05-06 ENCOUNTER — OFFICE VISIT (OUTPATIENT)
Age: 39
End: 2024-05-06
Payer: COMMERCIAL

## 2024-05-06 VITALS
TEMPERATURE: 98.8 F | BODY MASS INDEX: 24.04 KG/M2 | WEIGHT: 153.2 LBS | DIASTOLIC BLOOD PRESSURE: 80 MMHG | RESPIRATION RATE: 16 BRPM | HEART RATE: 73 BPM | HEIGHT: 67 IN | OXYGEN SATURATION: 98 % | SYSTOLIC BLOOD PRESSURE: 117 MMHG

## 2024-05-06 DIAGNOSIS — K42.0 INCARCERATED UMBILICAL HERNIA: Primary | ICD-10-CM

## 2024-05-06 PROCEDURE — 99204 OFFICE O/P NEW MOD 45 MIN: CPT | Performed by: SURGERY

## 2024-05-06 RX ORDER — FLUOXETINE HYDROCHLORIDE 20 MG/1
20 CAPSULE ORAL DAILY
COMMUNITY
Start: 2024-04-20

## 2024-05-06 ASSESSMENT — ENCOUNTER SYMPTOMS
VOMITING: 0
ABDOMINAL PAIN: 1
NAUSEA: 0
SORE THROAT: 0
EYE PAIN: 0
CONSTIPATION: 1
BACK PAIN: 0
WHEEZING: 0
BLOOD IN STOOL: 0
COUGH: 0
DIARRHEA: 0
STRIDOR: 0
SHORTNESS OF BREATH: 0

## 2024-05-06 ASSESSMENT — PATIENT HEALTH QUESTIONNAIRE - PHQ9
SUM OF ALL RESPONSES TO PHQ QUESTIONS 1-9: 0
2. FEELING DOWN, DEPRESSED OR HOPELESS: NOT AT ALL
1. LITTLE INTEREST OR PLEASURE IN DOING THINGS: NOT AT ALL
SUM OF ALL RESPONSES TO PHQ9 QUESTIONS 1 & 2: 0
SUM OF ALL RESPONSES TO PHQ QUESTIONS 1-9: 0

## 2024-05-06 NOTE — PROGRESS NOTES
Subjective:      Patient ID: Katie Garza is a 39 y.o. female who comes in for consultation by Keith Velasco MD and Vane Brown PA for a possible hernia      Chief Complaint   Patient presents with    Possible hernia     Seen at the request of RL Mariscal for evaluation of possible umbilical hernia       HPI     She has noted periumbilical swelling for over 4 years.  It is bothersome with activity.   The area does not reduce when laying down.  She had a CT enterography in 2020 for pain to the left of the umbilicus that was normal except a hernia.   She reports bloating and constipation as well.  She denies nausea, vomiting, diarrhea, melena, hematochezia, dysuria or hematuria.   She has not had abdominal surgery previously.    Past Medical History:   Diagnosis Date    Anxiety     Migraine     Scoliosis      Past Surgical History:   Procedure Laterality Date    TONSILLECTOMY AND ADENOIDECTOMY  1993     Family History   Problem Relation Age of Onset    Epilepsy Mother         adult onset    Hypothyroidism Mother     Anxiety Disorder Mother     Hypertension Father     Liver Disease Father     Crohn's Disease Father     Kidney Disease Father     Hypothyroidism Father      Social History     Tobacco Use    Smoking status: Never    Smokeless tobacco: Never   Vaping Use    Vaping Use: Never used   Substance Use Topics    Alcohol use: Yes     Alcohol/week: 1.0 - 2.0 standard drink of alcohol     Comment: occasionally    Drug use: Never     Current Outpatient Medications   Medication Sig    FLUoxetine (PROZAC) 20 MG capsule Take 1 capsule by mouth daily    vitamin D (CHOLECALCIFEROL) 25 MCG (1000 UT) TABS tablet Take 1 tablet by mouth daily    Lactobacillus (PROBIOTIC ACIDOPHILUS PO) Take 1 capsule by mouth daily    ibuprofen (ADVIL;MOTRIN) 600 MG tablet Take 1 tablet by mouth every 6 hours as needed     No current facility-administered medications for this visit.     Allergies   Allergen Reactions

## 2024-05-06 NOTE — PROGRESS NOTES
Identified pt with two pt identifiers (name and ). Reviewed chart in preparation for visit and have obtained necessary documentation.    Katie Garza is a 39 y.o. female  Chief Complaint   Patient presents with    Possible hernia     Seen at the request of RL Mariscal for evaluation of possible umbilical hernia     /80 (Site: Right Upper Arm, Position: Sitting)   Pulse 73   Temp 98.8 °F (37.1 °C) (Oral)   Resp 16   Ht 1.702 m (5' 7\")   Wt 69.5 kg (153 lb 3.2 oz)   SpO2 98%   BMI 23.99 kg/m²     1. Have you been to the ER, urgent care clinic since your last visit?  Hospitalized since your last visit?no    2. Have you seen or consulted any other health care providers outside of the [facility] since your last visit?  Include any pap smears or colon screening. no